# Patient Record
Sex: FEMALE | Race: BLACK OR AFRICAN AMERICAN | Employment: FULL TIME | URBAN - METROPOLITAN AREA
[De-identification: names, ages, dates, MRNs, and addresses within clinical notes are randomized per-mention and may not be internally consistent; named-entity substitution may affect disease eponyms.]

---

## 2019-03-07 ENCOUNTER — HOSPITAL ENCOUNTER (EMERGENCY)
Age: 34
Discharge: HOME | End: 2019-03-07
Attending: EMERGENCY MEDICINE
Payer: COMMERCIAL

## 2019-03-07 VITALS
HEART RATE: 96 BPM | WEIGHT: 196.25 LBS | TEMPERATURE: 97.7 F | HEIGHT: 63 IN | RESPIRATION RATE: 16 BRPM | BODY MASS INDEX: 34.77 KG/M2 | SYSTOLIC BLOOD PRESSURE: 111 MMHG | DIASTOLIC BLOOD PRESSURE: 86 MMHG | OXYGEN SATURATION: 98 %

## 2019-03-07 DIAGNOSIS — R10.30 LOWER ABDOMINAL PAIN: Primary | ICD-10-CM

## 2019-03-07 PROCEDURE — 84703 CHORIONIC GONADOTROPIN ASSAY: CPT

## 2019-03-07 PROCEDURE — 99284 EMERGENCY DEPT VISIT MOD MDM: CPT

## 2019-03-07 RX ORDER — HYDROCODONE BITARTRATE AND ACETAMINOPHEN 5; 325 MG/1; MG/1
1 TABLET ORAL EVERY 6 HOURS PRN
Qty: 12 TABLET | Refills: 0 | Status: SHIPPED | OUTPATIENT
Start: 2019-03-07 | End: 2019-03-10

## 2019-03-07 ASSESSMENT — PAIN SCALES - GENERAL: PAINLEVEL_OUTOF10: 8

## 2019-03-07 ASSESSMENT — PAIN DESCRIPTION - LOCATION: LOCATION: ABDOMEN

## 2019-03-07 ASSESSMENT — PAIN DESCRIPTION - DESCRIPTORS: DESCRIPTORS: THROBBING;DISCOMFORT

## 2019-03-07 ASSESSMENT — PAIN DESCRIPTION - FREQUENCY: FREQUENCY: INTERMITTENT

## 2019-03-07 ASSESSMENT — PAIN DESCRIPTION - ORIENTATION: ORIENTATION: LEFT;LOWER

## 2019-03-08 LAB — HCG, PREGNANCY URINE (POC): NEGATIVE

## 2019-03-08 ASSESSMENT — ENCOUNTER SYMPTOMS
COUGH: 0
NAUSEA: 0
ABDOMINAL PAIN: 0
SORE THROAT: 0
RHINORRHEA: 0
DIARRHEA: 0
COLOR CHANGE: 0
SINUS PRESSURE: 0
SHORTNESS OF BREATH: 0
VOMITING: 0
CONSTIPATION: 0
WHEEZING: 0

## 2019-04-29 PROBLEM — D21.9 FIBROIDS: Status: ACTIVE | Noted: 2019-04-29

## 2019-05-05 ENCOUNTER — HOSPITAL ENCOUNTER (EMERGENCY)
Age: 34
Discharge: HOME | End: 2019-05-05
Attending: EMERGENCY MEDICINE
Payer: COMMERCIAL

## 2019-05-05 VITALS
SYSTOLIC BLOOD PRESSURE: 116 MMHG | BODY MASS INDEX: 28.88 KG/M2 | WEIGHT: 195 LBS | TEMPERATURE: 98.8 F | HEIGHT: 69 IN | OXYGEN SATURATION: 98 % | RESPIRATION RATE: 18 BRPM | HEART RATE: 81 BPM | DIASTOLIC BLOOD PRESSURE: 68 MMHG

## 2019-05-05 DIAGNOSIS — H57.12 PAIN AROUND LEFT EYE: Primary | ICD-10-CM

## 2019-05-05 DIAGNOSIS — H15.002 SCLERITIS OF LEFT EYE: ICD-10-CM

## 2019-05-05 PROCEDURE — 6370000000 HC RX 637 (ALT 250 FOR IP): Performed by: EMERGENCY MEDICINE

## 2019-05-05 PROCEDURE — 99282 EMERGENCY DEPT VISIT SF MDM: CPT

## 2019-05-05 RX ORDER — OXYCODONE HYDROCHLORIDE AND ACETAMINOPHEN 5; 325 MG/1; MG/1
1 TABLET ORAL EVERY 6 HOURS PRN
Qty: 20 TABLET | Refills: 0 | Status: SHIPPED | OUTPATIENT
Start: 2019-05-05 | End: 2019-05-10

## 2019-05-05 RX ORDER — NEOMYCIN SULFATE, POLYMYXIN B SULFATE, AND DEXAMETHASONE 3.5; 10000; 1 MG/G; [USP'U]/G; MG/G
OINTMENT OPHTHALMIC ONCE
Status: COMPLETED | OUTPATIENT
Start: 2019-05-05 | End: 2019-05-05

## 2019-05-05 RX ADMIN — NEOMYCIN, POLYMYXIN B SULFATES, DEXAMETHASONE: 1; 3.5; 1 OINTMENT OPHTHALMIC at 05:32

## 2019-05-05 ASSESSMENT — VISUAL ACUITY
OS: 20/13
OD: 20/15
OU: 20/15

## 2019-05-05 ASSESSMENT — PAIN DESCRIPTION - PAIN TYPE: TYPE: ACUTE PAIN

## 2019-05-05 ASSESSMENT — PAIN DESCRIPTION - LOCATION: LOCATION: EYE

## 2019-05-05 ASSESSMENT — PAIN SCALES - GENERAL: PAINLEVEL_OUTOF10: 7

## 2019-05-05 ASSESSMENT — PAIN DESCRIPTION - ORIENTATION: ORIENTATION: LEFT

## 2019-05-05 NOTE — ED NOTES
Patient presents to ED via private auto c/o left eye pain onset today. Patient afebrile on arrival, VS wnl. NAD will continue to monitor.      Clementine Unger RN  05/05/19 4607

## 2019-05-05 NOTE — ED PROVIDER NOTES
[address] [place] [address] ED  eMERGENCY dEPARTMENT eNCOUnter      Pt Name: Kayode May  MRN: [id number]  [place] 1985  Date of evaluation: 2019  Provider: Troy Morris MD    CHIEF COMPLAINT       Chief Complaint   Patient presents with    Eye Pain     left          HISTORY OF PRESENT ILLNESS  (Location/Symptom, Timing/Onset, Context/Setting, Quality, Duration, Modifying Factors, Severity.)   Kayode May is a 30 y.o. female who presents to the emergency department for evaluation of eye discomfort. Patient has had discomfort for 4 days. No injury or trauma. She does not wear contacts, no changes in vision. She has developed discomfort to the suprerio aspect of the left eye region. Nursing Notes were reviewed. ALLERGIES     Patient has no known allergies. CURRENT MEDICATIONS       Previous Medications    FLUTICASONE (FLONASE) 50 MCG/ACT NASAL SPRAY    1 spray by Nasal route daily    LORATADINE (CLARITIN) 10 MG TABLET    Take 1 tablet by mouth daily    LORATADINE-PSEUDOEPHEDRINE (CLARITIN-D 24 HOUR)  MG PER TABLET    Take 1 tablet by mouth daily       PAST MEDICAL HISTORY         Diagnosis Date    Obesity        SURGICAL HISTORY     History reviewed. No pertinent surgical history. FAMILY HISTORY           Problem Relation Age of Onset    Diabetes Mother     High Blood Pressure Mother     Diabetes Father     Cancer Paternal Grandfather         prostate     Family Status   Relation Name Status    Mother  Alive    Father  Alive    PGF          SOCIAL HISTORY      reports that she quit smoking about 4 years ago. She has never used smokeless tobacco. She reports that she drinks alcohol. She reports that she does not use drugs. REVIEW OF SYSTEMS    (2-9 systems for level 4, 10 or more for level 5)     Review of Systems   All other systems reviewed and are negative. Except as noted above the remainder of the review of systems was reviewed and negative.      PHYSICAL 0.1 % OINT    Apply 0.5 inches to eye 4 times daily    OXYCODONE-ACETAMINOPHEN (PERCOCET) 5-325 MG PER TABLET    Take 1 tablet by mouth every 6 hours as needed for Pain for up to 5 days. Controlled Substances Monitoring:     RX Monitoring 5/5/2019   Attestation The Prescription Monitoring Report for this patient was reviewed today.    Chronic Pain Routine Monitoring No signs of potential drug abuse or diversion identified: otherwise, see note documentation     (Please note that portions of this note were completed with a voice recognition program.  Efforts were made to edit the dictations but occasionally words are mis-transcribed.)    Rochelle Pa MD  Attending Emergency Physician          Rochelle Pa MD  05/05/19 7621

## 2019-06-26 ENCOUNTER — OFFICE VISIT (OUTPATIENT)
Dept: FAMILY MEDICINE CLINIC | Age: 34
End: 2019-06-26
Payer: COMMERCIAL

## 2019-06-26 VITALS
HEIGHT: 63 IN | DIASTOLIC BLOOD PRESSURE: 81 MMHG | WEIGHT: 208.2 LBS | SYSTOLIC BLOOD PRESSURE: 110 MMHG | RESPIRATION RATE: 16 BRPM | HEART RATE: 82 BPM | BODY MASS INDEX: 36.89 KG/M2 | OXYGEN SATURATION: 97 %

## 2019-06-26 DIAGNOSIS — Z76.89 ESTABLISHING CARE WITH NEW DOCTOR, ENCOUNTER FOR: ICD-10-CM

## 2019-06-26 DIAGNOSIS — E66.9 OBESITY (BMI 30-39.9): ICD-10-CM

## 2019-06-26 DIAGNOSIS — Z00.00 ROUTINE GENERAL MEDICAL EXAMINATION AT A HEALTH CARE FACILITY: Primary | ICD-10-CM

## 2019-06-26 DIAGNOSIS — D21.9 FIBROIDS: ICD-10-CM

## 2019-06-26 PROCEDURE — 99385 PREV VISIT NEW AGE 18-39: CPT | Performed by: FAMILY MEDICINE

## 2019-06-26 ASSESSMENT — PATIENT HEALTH QUESTIONNAIRE - PHQ9
2. FEELING DOWN, DEPRESSED OR HOPELESS: 0
SUM OF ALL RESPONSES TO PHQ9 QUESTIONS 1 & 2: 0
1. LITTLE INTEREST OR PLEASURE IN DOING THINGS: 0
SUM OF ALL RESPONSES TO PHQ QUESTIONS 1-9: 0
SUM OF ALL RESPONSES TO PHQ QUESTIONS 1-9: 0

## 2019-06-26 ASSESSMENT — ENCOUNTER SYMPTOMS
EYE PAIN: 0
SHORTNESS OF BREATH: 0
BLOOD IN STOOL: 0

## 2019-06-26 NOTE — PROGRESS NOTES
[facility]  [address] [address]  Dept: [phone]    Cat Carpenter is a 30 y.o. female who presents today for hermedical conditions/complaints as noted below. Cat Carpenter is here today c/o New Patient and Weight Loss       HPI:     HPI    Here to establish care and for well check  Works at [employer]  Not in a relationship, no kids, not sexually active    Had surgery for uterine fibroids 2017, difficulty with progressive weight gain since then  Obesity, last had adipex  which didn't help, she is interested in JÄRVENPÄÄ   Not calorie counting or following portion control, trying to eat healthier types of foods    Last Pap: Aug 2018  Contraception: not interested    Needs labs    Wt Readings from Last 3 Encounters:   19 208 lb 3.2 oz (94.4 kg)   19 195 lb (88.5 kg)   19 196 lb 4 oz (89 kg)       Patient Active Problem List   Diagnosis    Obesity (BMI 30-39. 9)    Fibroids       Past Medical History:   Diagnosis Date    Obesity      No past surgical history on file. Family History   Problem Relation Age of Onset    Diabetes Mother     High Blood Pressure Mother     Diabetes Father     Cancer Paternal Grandfather         prostate     Social History     Tobacco Use    Smoking status: Former Smoker     Last attempt to quit: 2015     Years since quittin.4    Smokeless tobacco: Never Used    Tobacco comment: cigars daily   Substance Use Topics    Alcohol use: Yes     Comment: Socially    Drug use: No       Current Outpatient Medications:     Insulin Pen Needle 32G X 4 MM MISC, 1 each by Does not apply route daily, Disp: 100 each, Rfl: 3    liraglutide-weight management 18 MG/3ML SOPN SAXENDA: 0.6 mg once daily for one week; increase by 0.6 mg at weekly intervals to a target dose of 3 mg once daily. , Disp: 2 pen, Rfl: 2    Neomycin-Polymyxin-Dexameth (MAXITROL) 0.1 % OINT, Apply 0.5 inches to eye 4 times daily, Disp: 1 Tube, Rfl: 0    loratadine-pseudoephedrine (CLARITIN-D 24 HOUR)  MG per tablet, Take 1 tablet by mouth daily, Disp: 10 tablet, Rfl: 0    loratadine (CLARITIN) 10 MG tablet, Take 1 tablet by mouth daily, Disp: 30 tablet, Rfl: 3    fluticasone (FLONASE) 50 MCG/ACT nasal spray, 1 spray by Nasal route daily, Disp: 1 Bottle, Rfl: 3    Subjective:     Review of Systems   Constitutional: Negative for appetite change, diaphoresis, fever and unexpected weight change. HENT: Negative for ear pain. Eyes: Negative for pain. Respiratory: Negative for shortness of breath. Cardiovascular: Negative for chest pain and leg swelling. Gastrointestinal: Negative for blood in stool. Musculoskeletal: Negative for gait problem. Skin: Negative for pallor. Neurological: Negative for seizures. Psychiatric/Behavioral: Negative for dysphoric mood and suicidal ideas. Objective:     /81   Pulse 82   Resp 16   Ht 5' 3\" (1.6 m)   Wt 208 lb 3.2 oz (94.4 kg)   SpO2 97%   BMI 36.88 kg/m²     Physical Exam   Constitutional: She appears well-developed. No distress. HENT:   Head: Normocephalic. Eyes: Conjunctivae and EOM are normal.   Neck: Normal range of motion. Neck supple. No thyromegaly present. Cardiovascular: Normal heart sounds. No murmur heard. Pulmonary/Chest: Effort normal and breath sounds normal. No respiratory distress. She has no wheezes. Abdominal: Soft. There is no rebound and no guarding. Musculoskeletal: She exhibits no edema or deformity. Lymphadenopathy:     She has no cervical adenopathy. Neurological: She is alert. Skin: Skin is warm. No rash noted. She is not diaphoretic. Psychiatric: She has a normal mood and affect. Her behavior is normal. Judgment and thought content normal.       Assessment & Plan:      1. Establishing care with new doctor, encounter for    2.  Routine general medical examination at a [facility] care facility  Recommended healthy diet / daily exercise, avoidance of alcohol / smoking / recreational drugs  Recommended bi-annual dental exam / yearly vision exam   Mammogram after age 38, pap smear after age 24, colonoscopy after age 50  - Basic Metabolic Panel; Future  - CBC Auto Differential; Future  - Lipid Panel; Future  - Hemoglobin A1C; Future  - HIV Screen; Future  - TSH with Reflex; Future    3. Obesity (BMI 30-39. 9)  Had a detailed discussion about weight loss, portion control, calorie counting via [employer], taking stairs, parking far from destination, increasing intensity of physical activity. We will submit PA for Saxenda, she is interested in lifestyle coaching classes through weight management, pt is motivated to lose weight. Follow-up in 6 weeks. - TSH with Reflex; Future  - Insulin Pen Needle 32G X 4 MM MISC; 1 each by Does not apply route daily  Dispense: 100 each; Refill: 3  - liraglutide-weight management 18 MG/3ML SOPN; SAXENDA: 0.6 mg once daily for one week; increase by 0.6 mg at weekly intervals to a target dose of 3 mg once daily. Dispense: 2 pen; Refill: 2  - Nelly Penn DO, Weight Management and [facility]    4. Fibroids  Surgically removed, periods now much lighter    Call or return to clinic prn if these symptoms worsen or fail to improve as anticipated. I have reviewed the instructions with the patient, answering all questions to their satisfaction.     Electronically signed by Aric Fuchs MD on 6/26/2019 at 11:47 AM

## 2019-07-24 DIAGNOSIS — E66.9 OBESITY (BMI 30-39.9): ICD-10-CM

## 2019-07-29 DIAGNOSIS — E66.9 OBESITY (BMI 30-39.9): ICD-10-CM

## 2020-01-15 ENCOUNTER — OFFICE VISIT (OUTPATIENT)
Dept: FAMILY MEDICINE CLINIC | Age: 35
End: 2020-01-15
Payer: COMMERCIAL

## 2020-01-15 VITALS
HEART RATE: 75 BPM | TEMPERATURE: 98 F | SYSTOLIC BLOOD PRESSURE: 116 MMHG | OXYGEN SATURATION: 99 % | WEIGHT: 194.6 LBS | BODY MASS INDEX: 34.47 KG/M2 | DIASTOLIC BLOOD PRESSURE: 80 MMHG

## 2020-01-15 PROCEDURE — G8427 DOCREV CUR MEDS BY ELIG CLIN: HCPCS | Performed by: FAMILY MEDICINE

## 2020-01-15 PROCEDURE — G8484 FLU IMMUNIZE NO ADMIN: HCPCS | Performed by: FAMILY MEDICINE

## 2020-01-15 PROCEDURE — 99213 OFFICE O/P EST LOW 20 MIN: CPT | Performed by: FAMILY MEDICINE

## 2020-01-15 PROCEDURE — G8417 CALC BMI ABV UP PARAM F/U: HCPCS | Performed by: FAMILY MEDICINE

## 2020-01-15 PROCEDURE — 1036F TOBACCO NON-USER: CPT | Performed by: FAMILY MEDICINE

## 2020-01-15 ASSESSMENT — PATIENT HEALTH QUESTIONNAIRE - PHQ9
SUM OF ALL RESPONSES TO PHQ QUESTIONS 1-9: 0
2. FEELING DOWN, DEPRESSED OR HOPELESS: 0
SUM OF ALL RESPONSES TO PHQ QUESTIONS 1-9: 0
1. LITTLE INTEREST OR PLEASURE IN DOING THINGS: 0
SUM OF ALL RESPONSES TO PHQ9 QUESTIONS 1 & 2: 0

## 2020-01-15 ASSESSMENT — ENCOUNTER SYMPTOMS: SHORTNESS OF BREATH: 0

## 2020-01-15 NOTE — PROGRESS NOTES
Lulu Patrick MD  [place] [id number] [address] [address]  Dept: [phone]    Nicolás Mcnamara is a 30 y.o. female who presents today for hermedical conditions/complaints as noted below. Nicolás Mcnamara is here today c/o Check-Up (weight loss)       HPI:     HPI    Here to discuss her weight, was on Saxenda at the middle of last year but had to come off due to having car issues and so she was unable to head to the pharmacy for refills and unable to come in here for weight checks, did well with the JÄRVENPÄÄ for the few weeks she was on it, some nausea but it seemed to improve the longer she was taking it, she think she lost about 10 pounds during the time she was on this medication  Work is going well, denies feeling depressed or anxious  Needs to complete labs ordered at the last visit  Wt Readings from Last 3 Encounters:   01/15/20 194 lb 9.6 oz (88.3 kg)   19 208 lb 3.2 oz (94.4 kg)   19 195 lb (88.5 kg)       Patient Active Problem List   Diagnosis    Obesity (BMI 30-39. 9)    Fibroids       Past Medical History:   Diagnosis Date    Obesity      No past surgical history on file. Family History   Problem Relation Age of Onset    Diabetes Mother     High Blood Pressure Mother     Diabetes Father     Cancer Paternal Grandfather         prostate     Social History     Tobacco Use    Smoking status: Former Smoker     Last attempt to quit: 2015     Years since quittin.0    Smokeless tobacco: Never Used    Tobacco comment: cigars daily   Substance Use Topics    Alcohol use: Yes     Comment: Socially    Drug use: No       Current Outpatient Medications:     liraglutide-weight management 18 MG/3ML SOPN, SAXENDA: 0.6 mg once daily for one week; increase by 0.6 mg at weekly intervals to a target dose of 3 mg once daily. , Disp: 5 pen, Rfl: 0    Insulin Pen Needle 32G X 4 MM MISC, 1 each by Does not apply route daily, Disp: 100 each, Rfl: 3    loratadine-pseudoephedrine (CLARITIN-D 24 HOUR)  MG per tablet, Take 1 tablet by mouth daily, Disp: 10 tablet, Rfl: 0    loratadine (CLARITIN) 10 MG tablet, Take 1 tablet by mouth daily, Disp: 30 tablet, Rfl: 3    Subjective:     Review of Systems   Constitutional: Negative for fever. Respiratory: Negative for shortness of breath. Cardiovascular: Negative for chest pain. Objective:     /80   Pulse 75   Temp 98 °F (36.7 °C) (Oral)   Wt 194 lb 9.6 oz (88.3 kg)   SpO2 99%   BMI 34.47 kg/m²     Physical Exam  Constitutional:       Appearance: Normal appearance. She is well-developed. She is not ill-appearing, toxic-appearing or diaphoretic. HENT:      Head: Normocephalic. Eyes:      Conjunctiva/sclera: Conjunctivae normal.   Cardiovascular:      Rate and Rhythm: Normal rate. Heart sounds: Normal heart sounds. No murmur. Pulmonary:      Effort: Pulmonary effort is normal. No respiratory distress. Breath sounds: Normal breath sounds. No wheezing. Neurological:      Mental Status: She is alert. Psychiatric:         Behavior: Behavior normal.         Thought Content: Thought content normal.         Judgment: Judgment normal.         Assessment & Plan:      1. Obesity (BMI 30-39. 9)  Saxenda restarted, discussed side effects, discussed dose titration schedule, follow-up in 6 weeks for weight check  - liraglutide-weight management 18 MG/3ML SOPN; SAXENDA: 0.6 mg once daily for one week; increase by 0.6 mg at weekly intervals to a target dose of 3 mg once daily. Dispense: 5 pen; Refill: 0  - Insulin Pen Needle 32G X 4 MM MISC; 1 each by Does not apply route daily  Dispense: 100 each; Refill: 3    Flu shot declined    Call or return to clinic prn if these symptoms worsen or fail to improve as anticipated. I have reviewed the instructions with the patient, answering all questions to their satisfaction.     Electronically signed by Marilu Dela Cruz MD on 1/15/2020 at 2:39 PM

## 2020-02-26 ENCOUNTER — OFFICE VISIT (OUTPATIENT)
Dept: FAMILY MEDICINE CLINIC | Age: 35
End: 2020-02-26
Payer: COMMERCIAL

## 2020-02-26 VITALS
DIASTOLIC BLOOD PRESSURE: 80 MMHG | HEART RATE: 99 BPM | WEIGHT: 187.8 LBS | OXYGEN SATURATION: 100 % | SYSTOLIC BLOOD PRESSURE: 117 MMHG | HEIGHT: 63 IN | BODY MASS INDEX: 33.27 KG/M2

## 2020-02-26 PROCEDURE — G8427 DOCREV CUR MEDS BY ELIG CLIN: HCPCS | Performed by: FAMILY MEDICINE

## 2020-02-26 PROCEDURE — 1036F TOBACCO NON-USER: CPT | Performed by: FAMILY MEDICINE

## 2020-02-26 PROCEDURE — G8484 FLU IMMUNIZE NO ADMIN: HCPCS | Performed by: FAMILY MEDICINE

## 2020-02-26 PROCEDURE — 99213 OFFICE O/P EST LOW 20 MIN: CPT | Performed by: FAMILY MEDICINE

## 2020-02-26 PROCEDURE — G8417 CALC BMI ABV UP PARAM F/U: HCPCS | Performed by: FAMILY MEDICINE

## 2020-02-26 ASSESSMENT — ENCOUNTER SYMPTOMS
SHORTNESS OF BREATH: 0
DIARRHEA: 0
CONSTIPATION: 0
VOMITING: 0
NAUSEA: 0

## 2020-02-26 NOTE — PROGRESS NOTES
Mihai Cavazos MD  [address] MEDICINE  [facility] [address] [facility] [address] [address]  Dept: [phone]    Ruben Omalley is a 30 y.o. female who presents today for hermedical conditions/complaints as noted below. Ruben Omalley is here today c/o Other (follow up )       HPI:     HPI    Patient continues to do well with Saxenda  Has lost an additional 7 pound since last visit  She is now up to the maximum dose of the Saxenda at 3 mg  No longer feeling nauseous, her body is gotten used to it  She is trying her best to exert portion control  No adverse effects with the Saxenda, tolerating this well  Wt Readings from Last 3 Encounters:   20 187 lb 12.8 oz (85.2 kg)   01/15/20 194 lb 9.6 oz (88.3 kg)   19 208 lb 3.2 oz (94.4 kg)     Still has not completed lab work, reminder given    Patient Active Problem List   Diagnosis    Obesity (BMI 30-39. 9)    Fibroids       Past Medical History:   Diagnosis Date    Obesity      No past surgical history on file. Family History   Problem Relation Age of Onset    Diabetes Mother     High Blood Pressure Mother     Diabetes Father     Cancer Paternal Grandfather         prostate     Social History     Tobacco Use    Smoking status: Former Smoker     Last attempt to quit: 2015     Years since quittin.1    Smokeless tobacco: Never Used    Tobacco comment: cigars daily   Substance Use Topics    Alcohol use: Yes     Comment: Socially    Drug use: No       Current Outpatient Medications:     liraglutide-weight management 18 MG/3ML SOPN, SAXENDA: 3 mg SC once daily. , Disp: 15 pen, Rfl: 2    Insulin Pen Needle 32G X 4 MM MISC, 1 each by Does not apply route daily, Disp: 100 each, Rfl: 3    loratadine-pseudoephedrine (CLARITIN-D 24 HOUR)  MG per tablet, Take 1 tablet by mouth daily (Patient not taking: Reported on 2020), Disp: 10 tablet, Rfl: 0    loratadine (CLARITIN) 10 MG tablet, Take 1

## 2023-03-02 ENCOUNTER — HOSPITAL ENCOUNTER (EMERGENCY)
Facility: CLINIC | Age: 38
Discharge: HOME | End: 2023-03-02
Attending: EMERGENCY MEDICINE
Payer: COMMERCIAL

## 2023-03-02 VITALS
TEMPERATURE: 99.1 F | HEART RATE: 96 BPM | OXYGEN SATURATION: 97 % | BODY MASS INDEX: 35.44 KG/M2 | HEIGHT: 63 IN | RESPIRATION RATE: 18 BRPM | SYSTOLIC BLOOD PRESSURE: 121 MMHG | WEIGHT: 200 LBS | DIASTOLIC BLOOD PRESSURE: 81 MMHG

## 2023-03-02 DIAGNOSIS — J02.9 VIRAL PHARYNGITIS: Primary | ICD-10-CM

## 2023-03-02 LAB
S PYO AG THROAT QL: NEGATIVE
SOURCE: NORMAL

## 2023-03-02 PROCEDURE — 87880 STREP A ASSAY W/OPTIC: CPT

## 2023-03-02 PROCEDURE — 99283 EMERGENCY DEPT VISIT LOW MDM: CPT

## 2023-03-02 ASSESSMENT — ENCOUNTER SYMPTOMS
STRIDOR: 0
CHEST TIGHTNESS: 0
DIARRHEA: 0
FACIAL SWELLING: 0
SINUS PRESSURE: 0
RHINORRHEA: 0
SINUS PAIN: 0
WHEEZING: 0
BACK PAIN: 0
ABDOMINAL PAIN: 0
TROUBLE SWALLOWING: 0
SORE THROAT: 1
COUGH: 1
VOMITING: 0
VOICE CHANGE: 0
SHORTNESS OF BREATH: 0
NAUSEA: 0

## 2023-03-02 ASSESSMENT — LIFESTYLE VARIABLES
HOW OFTEN DO YOU HAVE A DRINK CONTAINING ALCOHOL: MONTHLY OR LESS
HOW MANY STANDARD DRINKS CONTAINING ALCOHOL DO YOU HAVE ON A TYPICAL DAY: 1 OR 2

## 2023-03-02 ASSESSMENT — PAIN SCALES - GENERAL: PAINLEVEL_OUTOF10: 7

## 2023-03-02 ASSESSMENT — PAIN - FUNCTIONAL ASSESSMENT: PAIN_FUNCTIONAL_ASSESSMENT: 0-10

## 2023-03-02 NOTE — Clinical Note
Simona Ferris was seen and treated in our emergency department on 3/2/2023. She may return to work on 03/03/2023. If you have any questions or concerns, please don't hesitate to call.       Chayito Ramirez, PATIENCE - CNP

## 2023-03-02 NOTE — ED PROVIDER NOTES
[facility]  [facility]  Phone: [phone]        Pt Name: Coreen John  MRN: [id number]  [place] 1985  Date of evaluation: 3/2/23    CHIEFCOMPLAINT       Chief Complaint   Patient presents with    Pharyngitis       HISTORY OF PRESENT ILLNESS (Location/Symptom, Timing/Onset, Context/Setting, Quality, Duration, Modifying Factors, Severity)      Coreen John is a 30 y.o. female with no pertinent PMH who presents to the ED via private auto with complaint of a sore throat and nonproductive cough x2 days. Patient denies any fever chills, nausea or vomiting, chest pain or shortness of breath. Patient states she has been using cough drops only. Patient rates her sore throat pain a 7 out of 10. She denies any sick contacts, states she lives alone. Her vital signs are stable and she does not appear to be in any acute distress. PAST MEDICAL / SURGICAL / SOCIAL / FAMILY HISTORY     PMH:  has a past medical history of Obesity. Surgical History:  has no past surgical history on file. Social History:  reports that she quit smoking about 8 years ago. Her smoking use included cigarettes. She has never used smokeless tobacco. She reports current alcohol use. She reports that she does not use drugs. Family History: She indicated that her mother is alive. She indicated that her father is alive. She indicated that her paternal grandfather is . family history includes Cancer in her paternal grandfather; Diabetes in her father and mother; High Blood Pressure in her mother. Psychiatric History: None    Allergies: Patient has no known allergies. Home Medications:   Prior to Admission medications    Not on File       REVIEW OF SYSTEMS  (2-9 systems for level 4, 10 ormore for level 5)      Review of Systems   Constitutional:  Negative for activity change, appetite change, chills, diaphoresis, fatigue and fever. HENT:  Positive for postnasal drip and sore throat. Negative for congestion, ear discharge, ear pain, facial swelling, rhinorrhea, sinus pressure, sinus pain, tinnitus, trouble swallowing and voice change. Respiratory:  Positive for cough. Negative for chest tightness, shortness of breath, wheezing and stridor. Cardiovascular:  Negative for chest pain, palpitations and leg swelling. Gastrointestinal:  Negative for abdominal pain, diarrhea, nausea and vomiting. Genitourinary:  Negative for dysuria. Musculoskeletal:  Negative for arthralgias, back pain, joint swelling, myalgias and neck pain. Skin:  Negative for pallor and rash. Neurological:  Negative for dizziness, weakness, light-headedness, numbness and headaches. All other systems negative except as marked. PHYSICAL EXAM  (up to 7 for level 4, 8 or more for level 5)      INITIAL VITALS:  height is 5' 3\" (1.6 m) and weight is 90.7 kg (200 lb). Her oral temperature is 99.1 °F (37.3 °C). Her blood pressure is 121/81 and her pulse is 96. Her respiration is 18 and oxygen saturation is 97%. Vital signs reviewed. Physical Exam  Vitals and nursing note reviewed. Constitutional:       General: She is not in acute distress. Appearance: Normal appearance. She is normal weight. She is not ill-appearing, toxic-appearing or diaphoretic. HENT:      Head: Normocephalic and atraumatic. Right Ear: Tympanic membrane, ear canal and external ear normal. There is no impacted cerumen. Left Ear: Tympanic membrane, ear canal and external ear normal. There is no impacted cerumen. Nose: Nose normal. No congestion or rhinorrhea. Mouth/Throat:      Mouth: Mucous membranes are moist.      Pharynx: Oropharynx is clear. Posterior oropharyngeal erythema present. No oropharyngeal exudate. Eyes:      General: No scleral icterus. Right eye: No discharge. Left eye: No discharge. Extraocular Movements: Extraocular movements intact.       Pupils: Pupils are equal, round, and reactive to light. Cardiovascular:      Rate and Rhythm: Normal rate and regular rhythm. Pulses: Normal pulses. Heart sounds: Normal heart sounds. No murmur heard. Pulmonary:      Effort: Pulmonary effort is normal. No respiratory distress. Breath sounds: Normal breath sounds. No stridor. No wheezing, rhonchi or rales. Chest:      Chest wall: No tenderness. Abdominal:      General: Abdomen is flat. Bowel sounds are normal. There is no distension. Palpations: Abdomen is soft. Tenderness: There is no abdominal tenderness. There is no right CVA tenderness, left CVA tenderness, guarding or rebound. Musculoskeletal:         General: No deformity or signs of injury. Normal range of motion. Cervical back: Normal range of motion and neck supple. Right lower leg: No edema. Left lower leg: No edema. Lymphadenopathy:      Cervical: No cervical adenopathy. Skin:     General: Skin is warm and dry. Capillary Refill: Capillary refill takes less than 2 seconds. Coloration: Skin is not jaundiced or pale. Findings: No bruising, erythema or rash. Neurological:      General: No focal deficit present. Mental Status: She is alert and oriented to person, place, and time. Mental status is at baseline. Motor: No weakness. Coordination: Coordination normal.   Psychiatric:         Mood and Affect: Mood normal.         Behavior: Behavior normal.     DIFFERENTIAL DIAGNOSIS / MDM   Patient presents to ED with complaint described above, sore throat and dry cough x2 days. PLAN (LABS / IMAGING / EKG):  Orders Placed This Encounter   Procedures    STREP SCREEN GROUP A THROAT       MEDICATIONS ORDERED:  No orders of the defined types were placed in this encounter.       Controlled Substances Monitoring:     DIAGNOSTIC RESULTS     EKG: All EKG's are interpreted by the Emergency Department Physician who either signs or Co-signs this chart in the absenceof a cardiologist.    ***    RADIOLOGY: All images are read by the radiologist and their interpretations are reviewed. No results found. LABS:  Results for orders placed or performed during the hospital encounter of 03/02/23   STREP SCREEN GROUP A THROAT    Specimen: Throat   Result Value Ref Range    Source . THROAT SWAB     Strep A Ag NEGATIVE NEGATIVE       EMERGENCY DEPARTMENT COURSE           Vitals:    Vitals:    03/02/23 1709 03/02/23 1710   BP: 121/81    Pulse: 96    Resp: 18    Temp:  99.1 °F (37.3 °C)   TempSrc:  Oral   SpO2: 97%    Weight: 90.7 kg (200 lb)    Height: 5' 3\" (1.6 m)      -------------------------  BP: 121/81, Temp: 99.1 °F (37.3 °C), Heart Rate: 96, Resp: 18      RE-EVALUATION:  See ED Course notes above. DISCHARGE PLANNING:    ***    The patient and/or family and I have discussed the diagnosis and risks, and we agree with discharging home to follow-up with their pertinent providers. The patient appears stable for discharge and has been instructed to return immediately for new concerning symptoms or if the symptoms worsen in any way. The patient understands that at this time there is no evidence for a more malignant underlying process, but the patient also understands that early in the process of an illness or injury, an emergency department workup can be falsely reassuring. Routine discharge counseling was given, and the patient understands that worsening, changing or persistent symptoms should prompt an immediate call or follow up with their primary physician or return to the emergency department. I have reviewed the disposition diagnosis with the patient and or their family/guardian. I have answered their questions and given discharge instructions. They voiced understanding of these instructions and did not have any further questions or complaints.      This patient was seen by the attending physician and they agreed with the assessment and plan.    CONSULTS:  None    PROCEDURES:  None    FINAL IMPRESSION      1. Viral pharyngitis          DISPOSITION / PLAN     CONDITION ON DISPOSITION:   Good / Stable for discharge.  ***    PATIENT REFERRED TO:  [facility]  [address] [facility] [address]  [phone]  Go to   New or worsening symptoms    Call to establish care with PCP  [phone]  Call       DISCHARGE MEDICATIONS:  Current Discharge Medication List          PATIENCE Hester - CNP   Emergency Medicine Nurse Practitioner    (Please note that portions of this note were completed with a voice recognition program.  Efforts were made to edit the dictations but occasionally words aremis-transcribed.) [facility]:  [facility]  [address] [facility] [address]  [phone]  Go to   New or worsening symptoms    Call to establish care with PCP  [phone]  Call       DISCHARGE MEDICATIONS:  Discharge Medication List as of 3/2/2023  5:49 PM          PATIENCE White - CNP   Emergency Medicine Nurse Practitioner    (Please note that portions of this note were completed with a voice recognition program.  Efforts were made to edit the dictations but occasionally words aremis-transcribed.)       PATIENCE White CNP  03/06/23 [place] 109, PATIENCE Sanches CNP  03/06/23 1134

## 2023-03-02 NOTE — ED PROVIDER NOTES
Attending Supervisory Note/Shared Visit   I have personally performed a face to face diagnostic evaluation on this patient. I have reviewed the mid-levels findings and agree. History and Exam by me shows an alert and oriented patient seen with extender I agree with the assessment treatment plan and disposition  Mild pharyngitis is noted no significant respiratory distress is only taking cough drops for her complaints.     (Please note that portions of this note were completed with a voice recognition program.  Efforts were made to edit the dictations but occasionally words are mis-transcribed.)    Gonzalez Romero MD  Attending Emergency Physician       Gonzalez Romero MD  03/02/23 0191

## 2023-03-02 NOTE — DISCHARGE INSTRUCTIONS
Medications such as Allegra or Claritin, pseudoephedrine or phenylephrine can help review signs and symptoms of upper respiratory virus illnesses. Be careful not to double up on ingredients when medications, and combination forms. You can use throat lozenges such as Cepacol to help lessen your sore throat pain. Take your medication as indicated and prescribed. If you are given an antibiotic, then make sure you get the prescription filled and take the antibiotics until finished. Drink plenty of water while taking the antibiotics. Avoid drinking alcohol or drinks that have caffeine in it while taking antibiotics. For pain use acetaminophen (Tylenol) or ibuprofen (Motrin / Advil), unless prescribed medications that have acetaminophen or ibuprofen (or similar medications) in it. You can take over the counter acetaminophen tablets (1 - 2 tablets of the 500-mg strength every 6 hours) or ibuprofen tablets (2 tablets every 4 hours). Mix 1 teaspoon (5 ml) of Maalox with 1 teaspoon (5 ml) of liquid Benadryl, gargle for 1 minute then swallow. You can also use Cepacol lozenge or Chloraseptic spray to help soothe your throat. If you were diagnosed with strep throat, make sure that you throw your toothbrush away.     PLEASE RETURN TO THE EMERGENCY DEPARTMENT IMMEDIATELY for worsening symptoms, difficulty with swallowing foods or liquids, shortness of breath, wheezing, change in your voice, or if you develop any concerning symptoms such as: high fever not relieved by acetaminophen (Tylenol) and/or ibuprofen (Motrin / Advil), chills, shortness of breath, chest pain, feeling of your heart fluttering or racing, persistent nausea and/or vomiting, vomiting up blood, blood in your stool, loss of consciousness, numbness, weakness or tingling in the arms or legs or change in color of the extremities, changes in mental status, persistent headache, blurry vision, loss of bladder / bowel control, unable to follow up with your physician, or other any other care or concern.

## 2023-03-29 ENCOUNTER — OFFICE VISIT (OUTPATIENT)
Dept: FAMILY MEDICINE CLINIC | Age: 38
End: 2023-03-29

## 2023-03-29 ENCOUNTER — HOSPITAL ENCOUNTER (OUTPATIENT)
Age: 38
Setting detail: SPECIMEN
Discharge: HOME | End: 2023-03-29

## 2023-03-29 VITALS
HEIGHT: 63 IN | TEMPERATURE: 97.5 F | DIASTOLIC BLOOD PRESSURE: 87 MMHG | SYSTOLIC BLOOD PRESSURE: 125 MMHG | BODY MASS INDEX: 42.52 KG/M2 | WEIGHT: 240 LBS | HEART RATE: 80 BPM | OXYGEN SATURATION: 100 %

## 2023-03-29 DIAGNOSIS — Z13.220 ENCOUNTER FOR LIPID SCREENING FOR CARDIOVASCULAR DISEASE: ICD-10-CM

## 2023-03-29 DIAGNOSIS — Z13.6 ENCOUNTER FOR LIPID SCREENING FOR CARDIOVASCULAR DISEASE: ICD-10-CM

## 2023-03-29 DIAGNOSIS — E66.01 MORBID OBESITY WITH BMI OF 40.0-44.9, ADULT (HCC): ICD-10-CM

## 2023-03-29 DIAGNOSIS — Z00.01 ENCOUNTER FOR WELL ADULT EXAM WITH ABNORMAL FINDINGS: Primary | ICD-10-CM

## 2023-03-29 DIAGNOSIS — Z13.1 DIABETES MELLITUS SCREENING: ICD-10-CM

## 2023-03-29 LAB
ABSOLUTE EOS #: 0.17 K/UL (ref 0–0.44)
ABSOLUTE IMMATURE GRANULOCYTE: <0.03 K/UL (ref 0–0.3)
ABSOLUTE LYMPH #: 1.43 K/UL (ref 1.1–3.7)
ABSOLUTE MONO #: 0.47 K/UL (ref 0.1–1.2)
ALBUMIN SERPL-MCNC: 4.1 G/DL (ref 3.5–5.2)
ALBUMIN/GLOBULIN RATIO: 1.2 (ref 1–2.5)
ALP SERPL-CCNC: 70 U/L (ref 35–104)
ALT SERPL-CCNC: 24 U/L (ref 5–33)
ANION GAP SERPL CALCULATED.3IONS-SCNC: 11 MMOL/L (ref 9–17)
AST SERPL-CCNC: 24 U/L
BASOPHILS # BLD: 1 % (ref 0–2)
BASOPHILS ABSOLUTE: 0.05 K/UL (ref 0–0.2)
BILIRUB SERPL-MCNC: 0.2 MG/DL (ref 0.3–1.2)
BUN SERPL-MCNC: 14 MG/DL (ref 6–20)
CALCIUM SERPL-MCNC: 9.4 MG/DL (ref 8.6–10.4)
CHLORIDE SERPL-SCNC: 102 MMOL/L (ref 98–107)
CHOLEST SERPL-MCNC: 210 MG/DL
CHOLESTEROL/HDL RATIO: 4.9
CO2 SERPL-SCNC: 25 MMOL/L (ref 20–31)
CREAT SERPL-MCNC: 0.74 MG/DL (ref 0.5–0.9)
EOSINOPHILS RELATIVE PERCENT: 3 % (ref 1–4)
EST. AVERAGE GLUCOSE BLD GHB EST-MCNC: 108 MG/DL
GFR SERPL CREATININE-BSD FRML MDRD: >60 ML/MIN/1.73M2
GLUCOSE SERPL-MCNC: 96 MG/DL (ref 70–99)
HBA1C MFR BLD: 5.4 % (ref 4–6)
HCT VFR BLD AUTO: 36.8 % (ref 36.3–47.1)
HDLC SERPL-MCNC: 43 MG/DL
HGB BLD-MCNC: 12 G/DL (ref 11.9–15.1)
IMMATURE GRANULOCYTES: 0 %
LDLC SERPL CALC-MCNC: ABNORMAL MG/DL (ref 0–130)
LDLC SERPL DIRECT ASSAY-MCNC: 125 MG/DL
LYMPHOCYTES # BLD: 22 % (ref 24–43)
MCH RBC QN AUTO: 31.6 PG (ref 25.2–33.5)
MCHC RBC AUTO-ENTMCNC: 32.6 G/DL (ref 28.4–34.8)
MCV RBC AUTO: 96.8 FL (ref 82.6–102.9)
MONOCYTES # BLD: 7 % (ref 3–12)
NRBC AUTOMATED: 0 PER 100 WBC
PDW BLD-RTO: 12.8 % (ref 11.8–14.4)
PLATELET # BLD AUTO: 292 K/UL (ref 138–453)
PMV BLD AUTO: 10.3 FL (ref 8.1–13.5)
POTASSIUM SERPL-SCNC: 4 MMOL/L (ref 3.7–5.3)
PROT SERPL-MCNC: 7.4 G/DL (ref 6.4–8.3)
RBC # BLD: 3.8 M/UL (ref 3.95–5.11)
SEG NEUTROPHILS: 67 % (ref 36–65)
SEGMENTED NEUTROPHILS ABSOLUTE COUNT: 4.39 K/UL (ref 1.5–8.1)
SODIUM SERPL-SCNC: 138 MMOL/L (ref 135–144)
T4 FREE SERPL-MCNC: 1.1 NG/DL (ref 0.93–1.7)
TRIGL SERPL-MCNC: 437 MG/DL
TSH SERPL-ACNC: 5.12 UIU/ML (ref 0.3–5)
WBC # BLD AUTO: 6.5 K/UL (ref 3.5–11.3)

## 2023-03-29 RX ORDER — PHENTERMINE HYDROCHLORIDE 37.5 MG/1
37.5 TABLET ORAL
Qty: 30 TABLET | Refills: 0 | Status: SHIPPED | OUTPATIENT
Start: 2023-03-29 | End: 2023-04-28

## 2023-03-29 SDOH — ECONOMIC STABILITY: HOUSING INSECURITY
IN THE LAST 12 MONTHS, WAS THERE A TIME WHEN YOU DID NOT HAVE A STEADY PLACE TO SLEEP OR SLEPT IN A SHELTER (INCLUDING NOW)?: NO

## 2023-03-29 SDOH — ECONOMIC STABILITY: INCOME INSECURITY: HOW HARD IS IT FOR YOU TO PAY FOR THE VERY BASICS LIKE FOOD, HOUSING, MEDICAL CARE, AND HEATING?: NOT HARD AT ALL

## 2023-03-29 SDOH — ECONOMIC STABILITY: FOOD INSECURITY: WITHIN THE PAST 12 MONTHS, THE FOOD YOU BOUGHT JUST DIDN'T LAST AND YOU DIDN'T HAVE MONEY TO GET MORE.: NEVER TRUE

## 2023-03-29 SDOH — ECONOMIC STABILITY: FOOD INSECURITY: WITHIN THE PAST 12 MONTHS, YOU WORRIED THAT YOUR FOOD WOULD RUN OUT BEFORE YOU GOT MONEY TO BUY MORE.: NEVER TRUE

## 2023-03-29 ASSESSMENT — PATIENT HEALTH QUESTIONNAIRE - PHQ9
SUM OF ALL RESPONSES TO PHQ9 QUESTIONS 1 & 2: 0
2. FEELING DOWN, DEPRESSED OR HOPELESS: 0
SUM OF ALL RESPONSES TO PHQ QUESTIONS 1-9: 0
1. LITTLE INTEREST OR PLEASURE IN DOING THINGS: 0
SUM OF ALL RESPONSES TO PHQ QUESTIONS 1-9: 0

## 2023-03-29 NOTE — PROGRESS NOTES
eye exhibits no discharge. No scleral icterus. Neck: Normal range of motion. Neck supple. No JVD present. No tracheal deviation present. No thyromegaly present. Cardiovascular: Normal rate, regular rhythm, normal heart sounds. Pulmonary/Chest: Effort normal and breath sounds normal. No respiratory distress. She has no wheezes. She has no rales. Abdominal: Soft. Bowel sounds are normal.  She exhibits no distension and no mass. There is no tenderness. There is no rebound and no guarding. Musculoskeletal: Normal range of motion. She exhibits no edema or tenderness. Lymphadenopathy:    She has no cervical adenopathy. Neurological:  She is alert and oriented to person, place, and time. Cranial nerves grossly intact. No sensation problem noted. Muscle strength 5/5 throughout. Skin: Skin is warm and dry. No rash noted. No erythema. Psychiatric:  She has a normal mood and affect. Behavior is normal.      No flowsheet data found. Lab Results   Component Value Date/Time    CHOL 144 07/29/2014 02:45 PM    TRIG 97 07/29/2014 02:45 PM    HDL 35 07/29/2014 02:45 PM    LDLCHOLESTEROL 90 07/29/2014 02:45 PM    GLUCOSE 99 04/12/2017 05:05 AM    LABA1C 5.5 07/29/2014 02:45 PM       The ASCVD Risk score (Miquel DK, et al., 2019) failed to calculate for the following reasons:     The 2019 ASCVD risk score is only valid for ages 36 to 78    Immunization History   Administered Date(s) Administered    COVID-19, PFIZER PURPLE top, DILUTE for use, (age 15 y+), 30mcg/0.3mL 07/01/2021, 07/22/2021, 01/21/2022       Health Maintenance   Topic Date Due    Varicella vaccine (1 of 2 - 2-dose childhood series) Never done    Depression Screen  Never done    HIV screen  Never done    Hepatitis C screen  Never done    DTaP/Tdap/Td vaccine (1 - Tdap) Never done    Cervical cancer screen  Never done    COVID-19 Vaccine (4 - Booster for David Peter series) 03/18/2022    Diabetes screen  04/30/2022    Flu vaccine (1) Never done

## 2023-03-30 ENCOUNTER — TELEPHONE (OUTPATIENT)
Dept: BARIATRICS/WEIGHT MGMT | Age: 38
End: 2023-03-30

## 2023-03-30 NOTE — TELEPHONE ENCOUNTER
Online Info Session Completed:  on 3/29/23 okay to schedule with Dr. Chaz Carroll   with mmo    Patient informed the following: This is NOT a guarantee of payment  When stating that you have a Benefit or Coverage for Bariatric Surgery - that means that you may qualify for the surgery  Bariatric Surgery is considered an elective procedure, patient is responsible to know their benefits . Any information we obtain when calling your insurance  is not  a guarantee of  coverage  and/or  benefit. Appointment Note :   New Patient , mmo,   3   month visits,  PG Fee $200,  Mailed Packet or advised to arrive  early      Remind Patient of $200 Program fee with $ 100 required at Second visit with office on initial dietician visit. Remind Patient they must be nicotine free. They will be tested at the beginning of the program and prior to surgery. Advise Patient Responsible for out of pocket, copay at medical visits, Deductible and coinsurance applied to medical visits and procedure. You will be responsible for any of the following:  Copays   Deductibles   Co insurances   100%    The items mentioned above are indicated or required by your insurance plan. Your deductible and coinsurance are applied to medical visits and procedures. Verified with patient if he or she has had any previous bariatric surgery? no  ( If yes ,advise patient of transfer of care process and program fee)       .

## 2023-03-31 ENCOUNTER — TELEPHONE (OUTPATIENT)
Dept: FAMILY MEDICINE CLINIC | Age: 38
End: 2023-03-31

## 2023-03-31 NOTE — TELEPHONE ENCOUNTER
Patient's PA was approved for phentermine (ADIPEX-P) 37.5 MG tablet     Approved  PA Detail   Prior authorization approved Case ID: [id number]      Payer:  [employer] Payer    [phone]    Your PA request has been approved. Additional information will be provided in the approval communication.  (Message [phone])     Approval Details    Authorized from March 30, 2023 to June 30, 2023      Electronic appeal:  Not supported

## 2023-05-04 ENCOUNTER — OFFICE VISIT (OUTPATIENT)
Dept: FAMILY MEDICINE CLINIC | Age: 38
End: 2023-05-04
Payer: COMMERCIAL

## 2023-05-04 VITALS
WEIGHT: 229 LBS | OXYGEN SATURATION: 100 % | SYSTOLIC BLOOD PRESSURE: 125 MMHG | BODY MASS INDEX: 40.57 KG/M2 | TEMPERATURE: 97 F | HEART RATE: 75 BPM | DIASTOLIC BLOOD PRESSURE: 80 MMHG

## 2023-05-04 DIAGNOSIS — E66.01 MORBID OBESITY WITH BMI OF 40.0-44.9, ADULT (HCC): ICD-10-CM

## 2023-05-04 PROCEDURE — 99213 OFFICE O/P EST LOW 20 MIN: CPT | Performed by: FAMILY MEDICINE

## 2023-05-04 RX ORDER — PHENTERMINE HYDROCHLORIDE 37.5 MG/1
37.5 TABLET ORAL
Qty: 30 TABLET | Refills: 0 | Status: SHIPPED | OUTPATIENT
Start: 2023-05-04 | End: 2023-06-03

## 2023-05-04 NOTE — PROGRESS NOTES
ulcers. Musculoskeletal: normal gait. Nails: no clubbing or cyanosis. Psychiatric: alert and oriented to place, time and person. Normal mood and affect. Assessment:       Diagnosis Orders   1. Morbid obesity with BMI of 40.0-44.9, adult (HCC)  phentermine (ADIPEX-P) 37.5 MG tablet          Plan:      Sec Prescription of Adipex provided. Continue exercise and diet. Follow-up as indicated. Patient will continue current diet and exercise regimen. I discussed with her to exercise at least 30 minutes 5 times a week. Decrease carbohydrate intake. Increase fibers and protein. See me back in 4 weeks for weight check. Call if any changes. Stop Adipex if you have any side effects. Return in 4 weeks (on 6/1/2023), or if symptoms worsen or fail to improve, for weight. No orders of the defined types were placed in this encounter. Orders Placed This Encounter   Medications    phentermine (ADIPEX-P) 37.5 MG tablet     Sig: Take 1 tablet by mouth every morning (before breakfast) for 30 days. Max Daily Amount: 37.5 mg     Dispense:  30 tablet     Refill:  0       Call or return to clinic prn if these symptoms worsen or fail to improve as anticipated. I have reviewed the instructions with the patient, answering all questions to her satisfaction. Joshua Black received counseling on the following healthy behaviors: nutrition, exercise, and medication adherence  Reviewed prior labs and health maintenance. Continue current medications, diet and exercise. Discussed use, benefit, and side effects of prescribed medications. Barriers to medication compliance addressed. Patient given educational materials - see patient instructions. All patient questions answered. Patient voiced understanding.       Electronically signed by Nai Barragan MD on 5/4/2023 at 3:40 PM       (Please note that portions of this note were completed with a voice recognition program. Efforts were made to edit the dictations but

## 2023-06-07 ENCOUNTER — TELEPHONE (OUTPATIENT)
Dept: FAMILY MEDICINE CLINIC | Age: 38
End: 2023-06-07

## 2023-06-07 DIAGNOSIS — E66.01 MORBID OBESITY WITH BMI OF 40.0-44.9, ADULT (HCC): ICD-10-CM

## 2023-06-07 RX ORDER — PHENTERMINE HYDROCHLORIDE 37.5 MG/1
37.5 TABLET ORAL
Qty: 30 TABLET | Refills: 0 | Status: CANCELLED | OUTPATIENT
Start: 2023-06-07 | End: 2023-07-07

## 2023-06-09 ENCOUNTER — TELEMEDICINE (OUTPATIENT)
Dept: FAMILY MEDICINE CLINIC | Age: 38
End: 2023-06-09
Payer: COMMERCIAL

## 2023-06-09 DIAGNOSIS — E66.01 MORBID OBESITY WITH BMI OF 40.0-44.9, ADULT (HCC): ICD-10-CM

## 2023-06-09 PROCEDURE — 99213 OFFICE O/P EST LOW 20 MIN: CPT | Performed by: FAMILY MEDICINE

## 2023-06-09 RX ORDER — PHENTERMINE HYDROCHLORIDE 37.5 MG/1
37.5 TABLET ORAL
Qty: 30 TABLET | Refills: 0 | Status: SHIPPED | OUTPATIENT
Start: 2023-06-09 | End: 2023-07-09

## 2023-06-09 ASSESSMENT — PATIENT HEALTH QUESTIONNAIRE - PHQ9
2. FEELING DOWN, DEPRESSED OR HOPELESS: 0
SUM OF ALL RESPONSES TO PHQ QUESTIONS 1-9: 0
1. LITTLE INTEREST OR PLEASURE IN DOING THINGS: 0
SUM OF ALL RESPONSES TO PHQ QUESTIONS 1-9: 0
SUM OF ALL RESPONSES TO PHQ9 QUESTIONS 1 & 2: 0
SUM OF ALL RESPONSES TO PHQ QUESTIONS 1-9: 0
SUM OF ALL RESPONSES TO PHQ QUESTIONS 1-9: 0

## 2023-06-09 NOTE — PROGRESS NOTES
General FM note    TeleHealth encounter -- Audio/Visual (During QRHPI-07 public health emergency)    David Lindsey is a 41 y.o. female who presents today for follow up on her  medical conditions as noted below. David Lindsey is c/o of   Chief Complaint   Patient presents with    Weight Management       Patient Active Problem List:     Obesity (BMI 30-39. 9)     Fibroids     Past Medical History:   Diagnosis Date    Obesity       No past surgical history on file. Family History   Problem Relation Age of Onset    Diabetes Mother     High Blood Pressure Mother     Diabetes Father     Cancer Paternal Grandfather         prostate     Current Outpatient Medications   Medication Sig Dispense Refill    phentermine (ADIPEX-P) 37.5 MG tablet Take 1 tablet by mouth every morning (before breakfast) for 30 days. Max Daily Amount: 37.5 mg 30 tablet 0     No current facility-administered medications for this visit. ALLERGIES:  No Known Allergies    Social History     Tobacco Use    Smoking status: Former     Types: Cigarettes     Quit date: 2015     Years since quittin.4    Smokeless tobacco: Never    Tobacco comments:     cigars daily   Substance Use Topics    Alcohol use: Yes     Comment: Socially      There is no height or weight on file to calculate BMI. There were no vitals taken for this visit. Subjective:      HPI    41 y.o. female reaching out per tele med visit today. Reaching out today for weight check. Her last weight was 229 pounds. The third prescription was provided. Current weight: 222 lbs. Add 7 lbs. Diet: eating healthier, smaller portions  Exercises:Gym - 4 times a week for 1 hr. Side effects: none    Review of Systems   Constitutional: Negative for fever and unexpected weight change. Objective:   Physical Exam  General appearance: normal development, habitus and attention, no deformities. No distress. Pulmo: normal breathing pattern.   Psychiatric: alert and oriented to place, time

## 2023-07-11 ENCOUNTER — TELEPHONE (OUTPATIENT)
Dept: FAMILY MEDICINE CLINIC | Age: 38
End: 2023-07-11

## 2023-07-17 ENCOUNTER — OFFICE VISIT (OUTPATIENT)
Dept: FAMILY MEDICINE CLINIC | Age: 38
End: 2023-07-17
Payer: COMMERCIAL

## 2023-07-17 VITALS
DIASTOLIC BLOOD PRESSURE: 87 MMHG | WEIGHT: 219 LBS | TEMPERATURE: 97.3 F | OXYGEN SATURATION: 100 % | SYSTOLIC BLOOD PRESSURE: 119 MMHG | HEART RATE: 94 BPM | BODY MASS INDEX: 38.8 KG/M2 | HEIGHT: 63 IN

## 2023-07-17 DIAGNOSIS — D21.9 FIBROIDS: Primary | ICD-10-CM

## 2023-07-17 DIAGNOSIS — E66.01 MORBID OBESITY WITH BMI OF 40.0-44.9, ADULT (HCC): ICD-10-CM

## 2023-07-17 PROCEDURE — 99213 OFFICE O/P EST LOW 20 MIN: CPT | Performed by: FAMILY MEDICINE

## 2023-07-17 RX ORDER — PHENTERMINE HYDROCHLORIDE 37.5 MG/1
37.5 TABLET ORAL
Qty: 30 TABLET | Refills: 0 | Status: SHIPPED | OUTPATIENT
Start: 2023-07-17 | End: 2023-08-16

## 2023-07-17 ASSESSMENT — PATIENT HEALTH QUESTIONNAIRE - PHQ9
SUM OF ALL RESPONSES TO PHQ QUESTIONS 1-9: 0
SUM OF ALL RESPONSES TO PHQ QUESTIONS 1-9: 0
2. FEELING DOWN, DEPRESSED OR HOPELESS: 0
SUM OF ALL RESPONSES TO PHQ9 QUESTIONS 1 & 2: 0
SUM OF ALL RESPONSES TO PHQ QUESTIONS 1-9: 0
SUM OF ALL RESPONSES TO PHQ QUESTIONS 1-9: 0
1. LITTLE INTEREST OR PLEASURE IN DOING THINGS: 0

## 2023-07-20 ENCOUNTER — TELEPHONE (OUTPATIENT)
Dept: FAMILY MEDICINE CLINIC | Age: 38
End: 2023-07-20

## 2023-07-20 NOTE — TELEPHONE ENCOUNTER
Patient's PA was denied for phentermine (ADIPEX-P) 37.5 MG tablet     Denied  PA Detail   Your PA request has been denied. Additional information will be provided in the denial communication. (Message [phone]) Your PA request has been denied. Additional information will be provided in the denial communication.  (Message 2061) Case ID: [id number]      [facility]  [facility]    [phone]    Electronic appeal:  Not supported

## 2023-08-18 DIAGNOSIS — E66.01 MORBID OBESITY WITH BMI OF 40.0-44.9, ADULT (HCC): ICD-10-CM

## 2023-08-18 NOTE — TELEPHONE ENCOUNTER
Sindhu Spencer is calling to request a refill on the following medication(s):    Last Visit Date (If Applicable):  8/72/9998    Next Visit Date:    Visit date not found    Medication Request:  Requested Prescriptions     Pending Prescriptions Disp Refills    phentermine (ADIPEX-P) 37.5 MG tablet 30 tablet 0     Sig: Take 1 tablet by mouth every morning (before breakfast) for 30 days.  Max Daily Amount: 37.5 mg

## 2023-08-21 RX ORDER — PHENTERMINE HYDROCHLORIDE 37.5 MG/1
37.5 TABLET ORAL
Qty: 30 TABLET | Refills: 0 | Status: SHIPPED | OUTPATIENT
Start: 2023-08-21 | End: 2023-09-20

## 2023-10-04 ENCOUNTER — TELEPHONE (OUTPATIENT)
Dept: FAMILY MEDICINE CLINIC | Age: 38
End: 2023-10-04

## 2023-10-04 DIAGNOSIS — E66.01 MORBID OBESITY WITH BMI OF 40.0-44.9, ADULT (HCC): Primary | ICD-10-CM

## 2023-10-04 RX ORDER — PHENTERMINE HYDROCHLORIDE 37.5 MG/1
37.5 TABLET ORAL
Qty: 30 TABLET | Refills: 0 | Status: SHIPPED | OUTPATIENT
Start: 2023-10-04 | End: 2023-11-03

## 2023-10-04 NOTE — TELEPHONE ENCOUNTER
Patient called and is requesting a refill of adipex-p but is not in her chart. Patient stated that it should be her last refill before she has to come in. Please advise.